# Patient Record
Sex: MALE | Race: WHITE | NOT HISPANIC OR LATINO | Employment: OTHER | URBAN - METROPOLITAN AREA
[De-identification: names, ages, dates, MRNs, and addresses within clinical notes are randomized per-mention and may not be internally consistent; named-entity substitution may affect disease eponyms.]

---

## 2017-03-14 ENCOUNTER — OFFICE VISIT (OUTPATIENT)
Dept: PODIATRY | Facility: CLINIC | Age: 82
End: 2017-03-14
Payer: MEDICARE

## 2017-03-14 VITALS
HEART RATE: 81 BPM | HEIGHT: 70 IN | WEIGHT: 155 LBS | DIASTOLIC BLOOD PRESSURE: 69 MMHG | BODY MASS INDEX: 22.19 KG/M2 | SYSTOLIC BLOOD PRESSURE: 147 MMHG

## 2017-03-14 DIAGNOSIS — E11.51 TYPE 2 DIABETES MELLITUS WITH PERIPHERAL CIRCULATORY DISORDER: Primary | ICD-10-CM

## 2017-03-14 DIAGNOSIS — I87.2 VENOUS INSUFFICIENCY OF BOTH LOWER EXTREMITIES: ICD-10-CM

## 2017-03-14 DIAGNOSIS — B35.1 ONYCHOMYCOSIS: ICD-10-CM

## 2017-03-14 DIAGNOSIS — E11.42 TYPE 2 DIABETES MELLITUS WITH PERIPHERAL NEUROPATHY: ICD-10-CM

## 2017-03-14 PROCEDURE — 11721 DEBRIDE NAIL 6 OR MORE: CPT | Mod: Q9,S$GLB,, | Performed by: PODIATRIST

## 2017-03-14 PROCEDURE — 1160F RVW MEDS BY RX/DR IN RCRD: CPT | Mod: S$GLB,,, | Performed by: PODIATRIST

## 2017-03-14 PROCEDURE — 1157F ADVNC CARE PLAN IN RCRD: CPT | Mod: S$GLB,,, | Performed by: PODIATRIST

## 2017-03-14 PROCEDURE — 99999 PR PBB SHADOW E&M-EST. PATIENT-LVL III: CPT | Mod: PBBFAC,,, | Performed by: PODIATRIST

## 2017-03-14 PROCEDURE — 1126F AMNT PAIN NOTED NONE PRSNT: CPT | Mod: S$GLB,,, | Performed by: PODIATRIST

## 2017-03-14 PROCEDURE — 1159F MED LIST DOCD IN RCRD: CPT | Mod: S$GLB,,, | Performed by: PODIATRIST

## 2017-03-14 PROCEDURE — 99212 OFFICE O/P EST SF 10 MIN: CPT | Mod: 25,S$GLB,, | Performed by: PODIATRIST

## 2017-03-14 RX ORDER — FUROSEMIDE 20 MG/1
TABLET ORAL
COMMUNITY
Start: 2017-02-23

## 2017-03-14 NOTE — MR AVS SNAPSHOT
[facility]   [place]  [place] [address]  Phone: [phone]                  Kulwant Christopher   3/14/2017 10:30 AM   Office Visit    Description:  Male : 1928   Provider:  Wilian Jade DPM   Department:  [facility]           Reason for Visit     Diabetic Foot Exam     Diabetes Mellitus           Diagnoses this Visit        Comments    Type 2 diabetes mellitus with peripheral circulatory disorder    -  Primary     Type 2 diabetes mellitus with peripheral neuropathy         Onychomycosis         Venous insufficiency of both lower extremities                To Do List           Future Appointments        Provider Department Dept Phone    6/15/2017 10:30 AM Wilian Jade DPM [facility] [phone]      Goals (5 Years of Data)     None      Follow-Up and Disposition     Return in about 3 months (around 2017).      [facility]     [facility] On Call Nurse Care Line -  Assistance  Registered nurses in the [facility] On Call Center provide clinical advisement, health education, appointment booking, and other advisory services.  Call for this free service at [phone].             Medications                Verify that the below list of medications is an accurate representation of the medications you are currently taking.  If none reported, the list may be blank. If incorrect, please contact your healthcare provider. Carry this list with you in case of emergency.           Current Medications     clotrimazole-betamethasone 1-0.05% (LOTRISONE) cream Apply topically 2 (two) times daily. Apply to legs and feet twice a day.    COUMADIN 1 mg tablet     COUMADIN 4 mg tablet Take 4 mg by mouth Daily.     digoxin (LANOXIN) 250 mcg tablet Take 0.125 mg by mouth once daily. Patient to take half of tablet.    enalapril (VASOTEC) 20 MG tablet Take 20 mg by mouth once daily.     furosemide (LASIX) 20 MG tablet     KLOR-CON SPRINKLE 10 mEq CpSR     lovastatin  "(MEVACOR) 40 MG tablet Take 40 mg by mouth nightly.     mupirocin (BACTROBAN) 2 % ointment     oxybutynin (DITROPAN-XL) 10 MG 24 hr tablet     psyllium 0.52 gram capsule Take 0.52 g by mouth once daily.    triamcinolone acetonide 0.1% (KENALOG) 0.1 % cream     urea (CARMOL) 40 % Crea Apply topically 2 (two) times daily. Apply to feet twice daily.    verapamil (CALAN-SR) 240 MG CR tablet Take 240 mg by mouth once daily.            Clinical Reference Information           Your Vitals Were     BP Pulse Height Weight BMI    147/69 81 5' 10" (1.778 m) 70.3 kg (155 lb) 22.24 kg/m2      Blood Pressure          Most Recent Value    BP  (!)  147/69      Allergies as of 3/14/2017     No Known Allergies      Immunizations Administered on Date of Encounter - 3/14/2017     None      Language Assistance Services     ATTENTION: Language assistance services are available, free of charge. Please call [phone].      ATENCIÓN: Si habla chrisañol, tiene a bui disposición servicios gratuitos de asistencia lingüística. Llame al [phone].     SIVA Ý: N?u b?n nói Ti?ng Vi?t, có các d?ch v? h? tr? ngôn ng? mi?n phí dành cho b?n. G?i s? [phone].         [facility] complies with applicable Federal civil rights laws and does not discriminate on the basis of race, color, national origin, age, disability, or sex.        "

## 2017-03-14 NOTE — PROGRESS NOTES
"Subjective:      Patient ID: Kulwant Christopher is a 88 y.o. male.    Chief Complaint: Diabetic Foot Exam and Diabetes Mellitus    Kulwant is a 88 y.o. male who presents to the clinic for evaluation and treatment of high risk feet. Kulwant has a past medical history of Diabetes mellitus type II; Hyperlipidemia; and Hypertension. The patient's chief complaint is long, thick toenails. This patient has documented high risk feet requiring routine maintenance secondary to peripheral neuropathy. Patient often gets confused when questioned about his history. Accompanied by his daughter who acts as historian. No new complaints.    PCP: Norberto Collier IV, MD    Date Last Seen by PCP: 1/31/17    Current shoe gear:  Diabetic shoes    No results found for: HGBA1C  Vitals:    03/14/17 1035   BP: (!) 147/69   Pulse: 81   Weight: 70.3 kg (155 lb)   Height: 5' 10" (1.778 m)   PainSc: 0-No pain      Past Medical History:   Diagnosis Date    Diabetes mellitus type II     Hyperlipidemia     Hypertension        Past Surgical History:   Procedure Laterality Date    APPENDECTOMY      CAROTID STENT      CIRCUMCISION, PRIMARY      EYE SURGERY         No family history on file.    Social History     Social History    Marital status:      Spouse name: N/A    Number of children: N/A    Years of education: N/A     Social History Main Topics    Smoking status: Never Smoker    Smokeless tobacco: Never Used    Alcohol use No    Drug use: No    Sexual activity: Not Asked     Other Topics Concern    None     Social History Narrative       Current Outpatient Prescriptions   Medication Sig Dispense Refill    clotrimazole-betamethasone 1-0.05% (LOTRISONE) cream Apply topically 2 (two) times daily. Apply to legs and feet twice a day. 45 g 3    COUMADIN 1 mg tablet       COUMADIN 4 mg tablet Take 4 mg by mouth Daily.       digoxin (LANOXIN) 250 mcg tablet Take 0.125 mg by mouth once daily. Patient to take half of tablet.      " enalapril (VASOTEC) 20 MG tablet Take 20 mg by mouth once daily.       furosemide (LASIX) 20 MG tablet       KLOR-CON SPRINKLE 10 mEq CpSR       lovastatin (MEVACOR) 40 MG tablet Take 40 mg by mouth nightly.       mupirocin (BACTROBAN) 2 % ointment       oxybutynin (DITROPAN-XL) 10 MG 24 hr tablet       psyllium 0.52 gram capsule Take 0.52 g by mouth once daily.      triamcinolone acetonide 0.1% (KENALOG) 0.1 % cream       urea (CARMOL) 40 % Crea Apply topically 2 (two) times daily. Apply to feet twice daily. 85 g 2    verapamil (CALAN-SR) 240 MG CR tablet Take 240 mg by mouth once daily.        No current facility-administered medications for this visit.        Review of patient's allergies indicates:  No Known Allergies    Review of Systems   Constitution: Negative for chills, fever, weakness and malaise/fatigue.   Cardiovascular: Positive for leg swelling. Negative for chest pain and claudication.   Respiratory: Negative for cough and shortness of breath.    Skin: Positive for color change and nail changes. Negative for itching and rash.   Musculoskeletal: Positive for arthritis, joint pain and stiffness. Negative for back pain, muscle cramps and muscle weakness.   Gastrointestinal: Negative for nausea and vomiting.   Neurological: Negative for numbness and paresthesias.   Psychiatric/Behavioral: Negative for altered mental status.           Objective:      Physical Exam   Constitutional: No distress.   Cardiovascular: Intact distal pulses.    Pulses:       Dorsalis pedis pulses are 0 on the right side, and 1+ on the left side.        Posterior tibial pulses are 0 on the right side, and 0 on the left side.   CFT< 3 secs all toes bilateral foot, skin temp warm bilateral foot, no digital hair growth bilateral foot, mild to moderate non-pitting lower extremity edema telangiectasias bilateral.         Musculoskeletal:        Right foot: There is decreased range of motion and deformity.        Left foot:  There is decreased range of motion and deformity.   Semi-reducible digital contractures toes 2-5 bilateral foot. + gastrocnemius equinus bilateral. High arched supinated foot type bilateral foot. No pain with ROM or MMT bilateral foot.   Feet:   Right Foot:   Protective Sensation: 10 sites tested. 4 sites sensed.   Skin Integrity: Positive for dry skin. Negative for ulcer, blister, skin breakdown, erythema, warmth or callus.   Left Foot:   Protective Sensation: 10 sites tested. 9 sites sensed.   Skin Integrity: Positive for dry skin. Negative for ulcer, blister, skin breakdown, erythema, warmth or callus.   Neurological: He is alert. He has normal strength. A sensory deficit is present.   Vibratory sensation decreased left foot and absent right foot.   Skin: Skin is warm, dry and intact. No ecchymosis, no lesion, no petechiae and no rash noted. He is not diaphoretic. No cyanosis or erythema. No pallor. Nails show no clubbing.   Nails 1-5 bilateral are thickened, brittle, elongated, yellow and dystrophic with debris.     No open lesions or macerations bilateral lower extremity.     Skin is dry and flaky bilateral lower extremity.               Assessment:       Encounter Diagnoses   Name Primary?    Type 2 diabetes mellitus with peripheral circulatory disorder Yes    Type 2 diabetes mellitus with peripheral neuropathy     Onychomycosis     Venous insufficiency of both lower extremities          Plan:       Kulwant was seen today for diabetic foot exam and diabetes mellitus.    Diagnoses and all orders for this visit:    Type 2 diabetes mellitus with peripheral circulatory disorder    Type 2 diabetes mellitus with peripheral neuropathy    Onychomycosis    Venous insufficiency of both lower extremities      I counseled the patient on his conditions, their implications and medical management.    Shoe inspection. Diabetic Foot Education. Patient reminded of the importance of good nutrition and blood sugar control to  help prevent podiatric complications of diabetes. Patient instructed on proper foot hygeine. We discussed wearing proper shoe gear, daily foot inspections, never walking without protective shoe gear, never putting sharp instruments to feet, routine podiatric nail visits every 2-3 months.      With patient's permission, nails were aggressively reduced and debrided x 10 to their soft tissue attachment mechanically and with electric , removing all offending nail and debris. Patient relates relief following the procedure. He will continue to monitor the areas daily, inspect his feet, wear protective shoe gear when ambulatory, moisturizer to maintain skin integrity and follow in this office in approximately 2-3 months, sooner p.r.n.    Instructed to elevate feet above heart level.    RTC 3 months or prn.

## 2017-06-15 ENCOUNTER — OFFICE VISIT (OUTPATIENT)
Dept: PODIATRY | Facility: CLINIC | Age: 82
End: 2017-06-15
Payer: MEDICARE

## 2017-06-15 VITALS
WEIGHT: 155 LBS | SYSTOLIC BLOOD PRESSURE: 128 MMHG | HEIGHT: 70 IN | DIASTOLIC BLOOD PRESSURE: 61 MMHG | HEART RATE: 65 BPM | BODY MASS INDEX: 22.19 KG/M2

## 2017-06-15 DIAGNOSIS — E11.42 TYPE 2 DIABETES MELLITUS WITH PERIPHERAL NEUROPATHY: ICD-10-CM

## 2017-06-15 DIAGNOSIS — M20.10 HALLUX VALGUS (ACQUIRED), UNSPECIFIED FOOT: ICD-10-CM

## 2017-06-15 DIAGNOSIS — B35.1 ONYCHOMYCOSIS: ICD-10-CM

## 2017-06-15 DIAGNOSIS — E11.51 TYPE 2 DIABETES MELLITUS WITH PERIPHERAL CIRCULATORY DISORDER: Primary | ICD-10-CM

## 2017-06-15 PROCEDURE — 99999 PR PBB SHADOW E&M-EST. PATIENT-LVL IV: CPT | Mod: PBBFAC,,, | Performed by: PODIATRIST

## 2017-06-15 PROCEDURE — 11721 DEBRIDE NAIL 6 OR MORE: CPT | Mod: Q9,S$GLB,, | Performed by: PODIATRIST

## 2017-06-15 PROCEDURE — 99499 UNLISTED E&M SERVICE: CPT | Mod: S$GLB,,, | Performed by: PODIATRIST

## 2017-06-17 NOTE — PROGRESS NOTES
"Subjective:      Patient ID: Kulwant Christopher is a 89 y.o. male.    Chief Complaint: Diabetes Mellitus and Nail Care    Kulwant is a 89 y.o. male who presents to the clinic for evaluation and treatment of high risk feet. Kulwant has a past medical history of Diabetes mellitus type II; Hyperlipidemia; and Hypertension. The patient's chief complaint is long, thick toenails. This patient has documented high risk feet requiring routine maintenance secondary to peripheral neuropathy. Patient often gets confused when questioned about his history. Accompanied by his daughter who acts as historian. No new complaints.    PCP: Norberto Collier IV, MD    Date Last Seen by PCP: 1/31/17    Current shoe gear:  Diabetic shoes    No results found for: HGBA1C  Vitals:    06/15/17 1049   BP: 128/61   Pulse: 65   Weight: 70.3 kg (155 lb)   Height: 5' 10" (1.778 m)   PainSc: 0-No pain      Past Medical History:   Diagnosis Date    Diabetes mellitus type II     Hyperlipidemia     Hypertension        Past Surgical History:   Procedure Laterality Date    APPENDECTOMY      CAROTID STENT      CIRCUMCISION, PRIMARY      EYE SURGERY         No family history on file.    Social History     Social History    Marital status:      Spouse name: N/A    Number of children: N/A    Years of education: N/A     Social History Main Topics    Smoking status: Never Smoker    Smokeless tobacco: Never Used    Alcohol use No    Drug use: No    Sexual activity: Not Asked     Other Topics Concern    None     Social History Narrative    None       Current Outpatient Prescriptions   Medication Sig Dispense Refill    clotrimazole-betamethasone 1-0.05% (LOTRISONE) cream Apply topically 2 (two) times daily. Apply to legs and feet twice a day. 45 g 3    COUMADIN 1 mg tablet       COUMADIN 4 mg tablet Take 4 mg by mouth Daily.       digoxin (LANOXIN) 250 mcg tablet Take 0.125 mg by mouth once daily. Patient to take half of tablet.      " enalapril (VASOTEC) 20 MG tablet Take 20 mg by mouth once daily.       furosemide (LASIX) 20 MG tablet       KLOR-CON SPRINKLE 10 mEq CpSR       lovastatin (MEVACOR) 40 MG tablet Take 40 mg by mouth nightly.       mupirocin (BACTROBAN) 2 % ointment       oxybutynin (DITROPAN-XL) 10 MG 24 hr tablet       psyllium 0.52 gram capsule Take 0.52 g by mouth once daily.      triamcinolone acetonide 0.1% (KENALOG) 0.1 % cream       urea (CARMOL) 40 % Crea Apply topically 2 (two) times daily. Apply to feet twice daily. 85 g 2    verapamil (CALAN-SR) 240 MG CR tablet Take 240 mg by mouth once daily.        No current facility-administered medications for this visit.        Review of patient's allergies indicates:  No Known Allergies    Review of Systems   Constitution: Negative for chills, fever, weakness and malaise/fatigue.   Cardiovascular: Positive for leg swelling. Negative for chest pain and claudication.   Respiratory: Negative for cough and shortness of breath.    Skin: Positive for color change and nail changes. Negative for itching and rash.   Musculoskeletal: Positive for arthritis, joint pain and stiffness. Negative for back pain, muscle cramps and muscle weakness.   Gastrointestinal: Negative for nausea and vomiting.   Neurological: Negative for numbness and paresthesias.   Psychiatric/Behavioral: Negative for altered mental status.           Objective:      Physical Exam   Constitutional: No distress.   Cardiovascular: Intact distal pulses.    Pulses:       Dorsalis pedis pulses are 0 on the right side, and 1+ on the left side.        Posterior tibial pulses are 0 on the right side, and 0 on the left side.   CFT< 3 secs all toes bilateral foot, skin temp warm bilateral foot, no digital hair growth bilateral foot, mild to moderate non-pitting lower extremity edema telangiectasias bilateral.         Musculoskeletal:        Right foot: There is decreased range of motion and deformity.        Left foot:  There is decreased range of motion and deformity.   Semi-reducible digital contractures toes 2-5 bilateral foot. Track bound hallux abducto valgus bilateral foot R>L.     + gastrocnemius equinus bilateral.     High arched supinated foot type bilateral foot.     No pain with ROM or MMT bilateral foot.   Feet:   Right Foot:   Protective Sensation: 10 sites tested. 4 sites sensed.   Skin Integrity: Positive for dry skin. Negative for ulcer, blister, skin breakdown, erythema, warmth or callus.   Left Foot:   Protective Sensation: 10 sites tested. 9 sites sensed.   Skin Integrity: Positive for dry skin. Negative for ulcer, blister, skin breakdown, erythema, warmth or callus.   Neurological: He is alert. He has normal strength. A sensory deficit is present.   Vibratory sensation decreased left foot and absent right foot.   Skin: Skin is warm, dry and intact. Capillary refill takes 2 to 3 seconds. No ecchymosis, no lesion, no petechiae and no rash noted. He is not diaphoretic. No cyanosis or erythema. No pallor. Nails show no clubbing.   Nails 1-5 bilateral are thickened, brittle, elongated, yellow and dystrophic with debris.     No open lesions or macerations bilateral lower extremity.     Skin is dry and flaky bilateral lower extremity.               Assessment:       Encounter Diagnoses   Name Primary?    Type 2 diabetes mellitus with peripheral circulatory disorder Yes    Type 2 diabetes mellitus with peripheral neuropathy     Hallux valgus (acquired), unspecified foot     Onychomycosis          Plan:       Kulwant was seen today for diabetes mellitus and nail care.    Diagnoses and all orders for this visit:    Type 2 diabetes mellitus with peripheral circulatory disorder  -     DIABETIC SHOES FOR HOME USE    Type 2 diabetes mellitus with peripheral neuropathy  -     DIABETIC SHOES FOR HOME USE    Hallux valgus (acquired), unspecified foot  -     DIABETIC SHOES FOR HOME USE    Onychomycosis  -     DIABETIC SHOES  FOR HOME USE      I counseled the patient on his conditions, their implications and medical management.    Shoe inspection. Diabetic Foot Education. Patient reminded of the importance of good nutrition and blood sugar control to help prevent podiatric complications of diabetes. Patient instructed on proper foot hygeine. We discussed wearing proper shoe gear, daily foot inspections, never walking without protective shoe gear, never putting sharp instruments to feet, routine podiatric nail visits every 2-3 months.      With patient's permission, nails were aggressively reduced and debrided x 10 to their soft tissue attachment mechanically and with electric , removing all offending nail and debris. Patient relates relief following the procedure. He will continue to monitor the areas daily, inspect his feet, wear protective shoe gear when ambulatory, moisturizer to maintain skin integrity and follow in this office in approximately 2-3 months, sooner p.r.n.    RTC 3 months or prn.

## 2017-08-23 ENCOUNTER — TELEPHONE (OUTPATIENT)
Dept: PODIATRY | Facility: CLINIC | Age: 82
End: 2017-08-23

## 2017-08-23 DIAGNOSIS — E11.51 PERIPHERAL CIRCULATORY DISORDER ASSOCIATED WITH TYPE 2 DIABETES MELLITUS: Primary | ICD-10-CM

## 2017-08-23 DIAGNOSIS — I87.2 VENOUS INSUFFICIENCY OF BOTH LOWER EXTREMITIES: ICD-10-CM

## 2017-08-23 NOTE — TELEPHONE ENCOUNTER
----- Message from Geovanna Rico sent at 8/23/2017  3:18 PM CDT -----  Contact: Self [phone]  Calling to see what is the size of her dad's compression socks. Please advice

## 2017-09-19 ENCOUNTER — OFFICE VISIT (OUTPATIENT)
Dept: PODIATRY | Facility: CLINIC | Age: 82
End: 2017-09-19
Payer: MEDICARE

## 2017-09-19 VITALS
SYSTOLIC BLOOD PRESSURE: 131 MMHG | HEIGHT: 70 IN | BODY MASS INDEX: 22.19 KG/M2 | HEART RATE: 81 BPM | WEIGHT: 155 LBS | DIASTOLIC BLOOD PRESSURE: 44 MMHG

## 2017-09-19 DIAGNOSIS — E11.42 TYPE 2 DIABETES MELLITUS WITH PERIPHERAL NEUROPATHY: Primary | ICD-10-CM

## 2017-09-19 DIAGNOSIS — E11.51 PERIPHERAL CIRCULATORY DISORDER ASSOCIATED WITH TYPE 2 DIABETES MELLITUS: ICD-10-CM

## 2017-09-19 DIAGNOSIS — L30.9 DERMATITIS: ICD-10-CM

## 2017-09-19 DIAGNOSIS — B35.1 ONYCHOMYCOSIS: ICD-10-CM

## 2017-09-19 PROCEDURE — 3008F BODY MASS INDEX DOCD: CPT | Mod: S$GLB,,, | Performed by: PODIATRIST

## 2017-09-19 PROCEDURE — 1126F AMNT PAIN NOTED NONE PRSNT: CPT | Mod: S$GLB,,, | Performed by: PODIATRIST

## 2017-09-19 PROCEDURE — 11720 DEBRIDE NAIL 1-5: CPT | Mod: Q9,S$GLB,, | Performed by: PODIATRIST

## 2017-09-19 PROCEDURE — 1159F MED LIST DOCD IN RCRD: CPT | Mod: S$GLB,,, | Performed by: PODIATRIST

## 2017-09-19 PROCEDURE — 99999 PR PBB SHADOW E&M-EST. PATIENT-LVL III: CPT | Mod: PBBFAC,,, | Performed by: PODIATRIST

## 2017-09-19 PROCEDURE — 99213 OFFICE O/P EST LOW 20 MIN: CPT | Mod: 25,S$GLB,, | Performed by: PODIATRIST

## 2017-09-19 RX ORDER — CLOTRIMAZOLE AND BETAMETHASONE DIPROPIONATE 10; .64 MG/G; MG/G
CREAM TOPICAL 2 TIMES DAILY
Qty: 45 G | Refills: 3 | Status: SHIPPED | OUTPATIENT
Start: 2017-09-19

## 2017-09-20 NOTE — PROGRESS NOTES
"Subjective:      Patient ID: Kulwant Christopher is a 89 y.o. male.    Chief Complaint: Follow-up and Diabetic Foot Exam    Kulwant is a 89 y.o. male who presents to the clinic for evaluation and treatment of high risk feet. Kulwant has a past medical history of Diabetes mellitus type II; Hyperlipidemia; and Hypertension. The patient's chief complaint is long, thick toenails. This patient has documented high risk feet requiring routine maintenance secondary to peripheral neuropathy. Patient often gets confused when questioned about his history. Accompanied by his daughter who acts as historian. No new complaints. Reports trimming his nails last night.     PCP: Norberto Collier IV, MD    Date Last Seen by PCP: 1/31/17    Current shoe gear:  Diabetic shoes    No results found for: HGBA1C  Vitals:    09/19/17 0950   BP: (!) 131/44   Pulse: 81   Weight: 70.3 kg (155 lb)   Height: 5' 10" (1.778 m)   PainSc: 0-No pain      Past Medical History:   Diagnosis Date    Diabetes mellitus type II     Hyperlipidemia     Hypertension        Past Surgical History:   Procedure Laterality Date    APPENDECTOMY      CAROTID STENT      CIRCUMCISION, PRIMARY      EYE SURGERY         No family history on file.    Social History     Social History    Marital status:      Spouse name: N/A    Number of children: N/A    Years of education: N/A     Social History Main Topics    Smoking status: Never Smoker    Smokeless tobacco: Never Used    Alcohol use No    Drug use: No    Sexual activity: Not Asked     Other Topics Concern    None     Social History Narrative    None       Current Outpatient Prescriptions   Medication Sig Dispense Refill    clotrimazole-betamethasone 1-0.05% (LOTRISONE) cream Apply topically 2 (two) times daily. Apply to legs and feet twice a day. 45 g 3    COUMADIN 1 mg tablet       COUMADIN 4 mg tablet Take 4 mg by mouth Daily.       digoxin (LANOXIN) 250 mcg tablet Take 0.125 mg by mouth once " daily. Patient to take half of tablet.      enalapril (VASOTEC) 20 MG tablet Take 20 mg by mouth once daily.       furosemide (LASIX) 20 MG tablet       KLOR-CON SPRINKLE 10 mEq CpSR       lovastatin (MEVACOR) 40 MG tablet Take 40 mg by mouth nightly.       mupirocin (BACTROBAN) 2 % ointment       oxybutynin (DITROPAN-XL) 10 MG 24 hr tablet       psyllium 0.52 gram capsule Take 0.52 g by mouth once daily.      triamcinolone acetonide 0.1% (KENALOG) 0.1 % cream       urea (CARMOL) 40 % Crea Apply topically 2 (two) times daily. Apply to feet twice daily. 85 g 2    verapamil (CALAN-SR) 240 MG CR tablet Take 240 mg by mouth once daily.        No current facility-administered medications for this visit.        Review of patient's allergies indicates:  No Known Allergies    Review of Systems   Constitution: Negative for chills, fever, weakness and malaise/fatigue.   Cardiovascular: Positive for leg swelling. Negative for chest pain and claudication.   Respiratory: Negative for cough and shortness of breath.    Skin: Positive for color change and nail changes. Negative for itching and rash.   Musculoskeletal: Positive for arthritis, joint pain and stiffness. Negative for back pain, muscle cramps and muscle weakness.   Gastrointestinal: Negative for nausea and vomiting.   Neurological: Negative for numbness and paresthesias.   Psychiatric/Behavioral: Negative for altered mental status.           Objective:      Physical Exam   Constitutional: No distress.   Cardiovascular: Intact distal pulses.    Pulses:       Dorsalis pedis pulses are 0 on the right side, and 1+ on the left side.        Posterior tibial pulses are 0 on the right side, and 0 on the left side.   CFT< 3 secs all toes bilateral foot, skin temp warm bilateral foot, no digital hair growth bilateral foot, mild to moderate non-pitting lower extremity edema telangiectasias bilateral.         Musculoskeletal:        Right foot: There is decreased range of  motion and deformity.        Left foot: There is decreased range of motion and deformity.   Semi-reducible digital contractures toes 2-5 bilateral foot. Track bound hallux abducto valgus bilateral foot R>L.     + gastrocnemius equinus bilateral.     High arched supinated foot type bilateral foot.     No pain with ROM or MMT bilateral foot.   Feet:   Right Foot:   Protective Sensation: 10 sites tested. 4 sites sensed.   Skin Integrity: Positive for dry skin. Negative for ulcer, blister, skin breakdown, erythema, warmth or callus.   Left Foot:   Protective Sensation: 10 sites tested. 9 sites sensed.   Skin Integrity: Positive for dry skin. Negative for ulcer, blister, skin breakdown, erythema, warmth or callus.   Neurological: He is alert. He has normal strength. A sensory deficit is present.   Vibratory sensation decreased left foot and absent right foot.   Skin: Skin is warm, dry and intact. Capillary refill takes 2 to 3 seconds. No ecchymosis, no lesion, no petechiae and no rash noted. He is not diaphoretic. No cyanosis or erythema. No pallor. Nails show no clubbing.   Nails 1-5 right are thickened, brittle, elongated, yellow and dystrophic with debris.     No open lesions or macerations bilateral lower extremity.     Skin is dry and flaky bilateral lower extremity. Rash noted to dorsal left foot.                Assessment:       Encounter Diagnoses   Name Primary?    Type 2 diabetes mellitus with peripheral neuropathy Yes    Peripheral circulatory disorder associated with type 2 diabetes mellitus     Onychomycosis     Dermatitis          Plan:       Kulwant was seen today for follow-up and diabetic foot exam.    Diagnoses and all orders for this visit:    Type 2 diabetes mellitus with peripheral neuropathy    Peripheral circulatory disorder associated with type 2 diabetes mellitus    Onychomycosis    Dermatitis  -     clotrimazole-betamethasone 1-0.05% (LOTRISONE) cream; Apply topically 2 (two) times daily.  Apply to legs and feet twice a day.      I counseled the patient on his conditions, their implications and medical management.    Shoe inspection. Diabetic Foot Education. Patient reminded of the importance of good nutrition and blood sugar control to help prevent podiatric complications of diabetes. Patient instructed on proper foot hygeine. We discussed wearing proper shoe gear, daily foot inspections, never walking without protective shoe gear, never putting sharp instruments to feet, routine podiatric nail visits every 2-3 months.      With patient's permission, nails were aggressively reduced and debrided x 5 to their soft tissue attachment mechanically and with electric , removing all offending nail and debris. Patient relates relief following the procedure. He will continue to monitor the areas daily, inspect his feet, wear protective shoe gear when ambulatory, moisturizer to maintain skin integrity and follow in this office in approximately 2-3 months, sooner p.r.n.    Instructed to apply lotrisone to B/L feet.     RTC 3 months or prn.      Lorenza Cline DPM PGY-3    I have personally taken the history and examined this patient and agree with the resident's note as stated as above.   Wilian Jade DPM, FACFAS

## 2018-01-03 ENCOUNTER — TELEPHONE (OUTPATIENT)
Dept: PODIATRY | Facility: CLINIC | Age: 83
End: 2018-01-03

## 2018-01-03 NOTE — TELEPHONE ENCOUNTER
----- Message from Nava Massey sent at 1/3/2018  1:50 PM CST -----  Contact: Delia, daughter, [phone]  Requests to speak with you regarding 1/22 appt.  Please advise.

## 2018-01-22 ENCOUNTER — OFFICE VISIT (OUTPATIENT)
Dept: PODIATRY | Facility: CLINIC | Age: 83
End: 2018-01-22
Payer: MEDICARE

## 2018-01-22 VITALS
BODY MASS INDEX: 22.19 KG/M2 | SYSTOLIC BLOOD PRESSURE: 123 MMHG | DIASTOLIC BLOOD PRESSURE: 58 MMHG | HEART RATE: 77 BPM | HEIGHT: 70 IN | WEIGHT: 155 LBS

## 2018-01-22 DIAGNOSIS — E11.51 PERIPHERAL CIRCULATORY DISORDER ASSOCIATED WITH TYPE 2 DIABETES MELLITUS: ICD-10-CM

## 2018-01-22 DIAGNOSIS — I87.2 VENOUS INSUFFICIENCY OF BOTH LOWER EXTREMITIES: ICD-10-CM

## 2018-01-22 DIAGNOSIS — E11.42 TYPE 2 DIABETES MELLITUS WITH PERIPHERAL NEUROPATHY: Primary | ICD-10-CM

## 2018-01-22 DIAGNOSIS — B35.1 ONYCHOMYCOSIS: ICD-10-CM

## 2018-01-22 DIAGNOSIS — L30.9 DERMATITIS: ICD-10-CM

## 2018-01-22 PROCEDURE — 99212 OFFICE O/P EST SF 10 MIN: CPT | Mod: 25,S$GLB,, | Performed by: PODIATRIST

## 2018-01-22 PROCEDURE — 99999 PR PBB SHADOW E&M-EST. PATIENT-LVL III: CPT | Mod: PBBFAC,,, | Performed by: PODIATRIST

## 2018-01-22 PROCEDURE — 11721 DEBRIDE NAIL 6 OR MORE: CPT | Mod: Q9,S$GLB,, | Performed by: PODIATRIST

## 2018-01-22 NOTE — PROCEDURES
"Routine Foot Care  Date/Time: 1/22/2018 3:27 PM  Performed by: NICANOR KIDD  Authorized by: NICANOR KIDD     Time out: Immediately prior to procedure a "time out" was called to verify the correct patient, procedure, equipment, support staff and site/side marked as required.    Consent Done?:  Yes (Verbal)  Hyperkeratotic Skin Lesions?: No      Nail Care Type:  Debride  Location(s): All  (Left 1st Toe, Left 3rd Toe, Left 2nd Toe, Left 4th Toe, Left 5th Toe, Right 1st Toe, Right 2nd Toe, Right 3rd Toe, Right 4th Toe and Right 5th Toe)  Patient tolerance:  Patient tolerated the procedure well with no immediate complications      "

## 2018-01-23 ENCOUNTER — TELEPHONE (OUTPATIENT)
Dept: PODIATRY | Facility: CLINIC | Age: 83
End: 2018-01-23

## 2018-01-23 NOTE — PROGRESS NOTES
"Subjective:      Patient ID: Kulwant Christopher is a 89 y.o. male.    Chief Complaint: Nail Care (Pt doesn't have a PCP at the moment, no upcoming appt) and Foot Swelling (Right ankle)    Kulwant is a 89 y.o. male who presents to the clinic for evaluation and treatment of high risk feet. Kulwant has a past medical history of Diabetes mellitus type II; Hyperlipidemia; and Hypertension. The patient's chief complaint is long, thick toenails. This patient has documented high risk feet requiring routine maintenance secondary to peripheral neuropathy. Patient often gets confused when questioned about his history. Accompanied by his daughter who acts as historian. No new complaints. Reports trimming his nails last night.     PCP: Norberto Collier IV, MD    Date Last Seen by PCP: 1/31/17    Current shoe gear:  Diabetic shoes    No results found for: HGBA1C  Vitals:    01/22/18 1509   BP: (!) 123/58   Pulse: 77   Weight: 70.3 kg (155 lb)   Height: 5' 10" (1.778 m)   PainSc: 0-No pain      Past Medical History:   Diagnosis Date    Diabetes mellitus type II     Hyperlipidemia     Hypertension        Past Surgical History:   Procedure Laterality Date    APPENDECTOMY      CAROTID STENT      CIRCUMCISION, PRIMARY      EYE SURGERY         No family history on file.    Social History     Social History    Marital status:      Spouse name: N/A    Number of children: N/A    Years of education: N/A     Social History Main Topics    Smoking status: Never Smoker    Smokeless tobacco: Never Used    Alcohol use No    Drug use: No    Sexual activity: Not Asked     Other Topics Concern    None     Social History Narrative    None       Current Outpatient Prescriptions   Medication Sig Dispense Refill    clotrimazole-betamethasone 1-0.05% (LOTRISONE) cream Apply topically 2 (two) times daily. Apply to legs and feet twice a day. 45 g 3    COUMADIN 1 mg tablet       COUMADIN 4 mg tablet Take 4 mg by mouth Daily.       " digoxin (LANOXIN) 250 mcg tablet Take 0.125 mg by mouth once daily. Patient to take half of tablet.      enalapril (VASOTEC) 20 MG tablet Take 20 mg by mouth once daily.       furosemide (LASIX) 20 MG tablet       KLOR-CON SPRINKLE 10 mEq CpSR       lovastatin (MEVACOR) 40 MG tablet Take 40 mg by mouth nightly.       mupirocin (BACTROBAN) 2 % ointment       oxybutynin (DITROPAN-XL) 10 MG 24 hr tablet       psyllium 0.52 gram capsule Take 0.52 g by mouth once daily.      triamcinolone acetonide 0.1% (KENALOG) 0.1 % cream       urea (CARMOL) 40 % Crea Apply topically 2 (two) times daily. Apply to feet twice daily. 85 g 2    verapamil (CALAN-SR) 240 MG CR tablet Take 240 mg by mouth once daily.        No current facility-administered medications for this visit.        Review of patient's allergies indicates:  No Known Allergies    Review of Systems   Constitution: Negative for chills, fever, weakness and malaise/fatigue.   Cardiovascular: Positive for leg swelling. Negative for chest pain and claudication.   Respiratory: Negative for cough and shortness of breath.    Skin: Positive for color change and nail changes. Negative for itching and rash.   Musculoskeletal: Positive for arthritis, joint pain and stiffness. Negative for back pain, muscle cramps and muscle weakness.   Gastrointestinal: Negative for nausea and vomiting.   Neurological: Negative for numbness and paresthesias.   Psychiatric/Behavioral: Negative for altered mental status.           Objective:      Physical Exam   Constitutional: No distress.   Cardiovascular: Intact distal pulses.    Pulses:       Dorsalis pedis pulses are 0 on the right side, and 1+ on the left side.        Posterior tibial pulses are 0 on the right side, and 0 on the left side.   CFT< 3 secs all toes bilateral foot, skin temp warm bilateral foot, no digital hair growth bilateral foot, mild to moderate non-pitting lower extremity edema telangiectasias bilateral.          Musculoskeletal:        Right foot: There is decreased range of motion and deformity.        Left foot: There is decreased range of motion and deformity.   Semi-reducible digital contractures toes 2-5 bilateral foot. Track bound hallux abducto valgus bilateral foot R>L.     + gastrocnemius equinus bilateral.     High arched supinated foot type bilateral foot.     No pain with ROM or MMT bilateral foot.   Feet:   Right Foot:   Protective Sensation: 10 sites tested. 4 sites sensed.   Skin Integrity: Positive for dry skin. Negative for ulcer, blister, skin breakdown, erythema, warmth or callus.   Left Foot:   Protective Sensation: 10 sites tested. 9 sites sensed.   Skin Integrity: Positive for dry skin. Negative for ulcer, blister, skin breakdown, erythema, warmth or callus.   Neurological: He is alert. He has normal strength. A sensory deficit is present.   Vibratory sensation decreased left foot and absent right foot.   Skin: Skin is warm, dry and intact. Capillary refill takes 2 to 3 seconds. Rash noted. No ecchymosis, no lesion and no petechiae noted. Rash is papular. He is not diaphoretic. No cyanosis or erythema. No pallor. Nails show no clubbing.   Nails 1-5 right are thickened 2-3 mm, brittle, elongated 2-3 mm, yellow and dystrophic with debris.     No open lesions or macerations bilateral lower extremity.     Skin is dry and flaky bilateral lower extremity. Rash noted to dorsal left foot.                Assessment:       Encounter Diagnoses   Name Primary?    Type 2 diabetes mellitus with peripheral neuropathy Yes    Peripheral circulatory disorder associated with type 2 diabetes mellitus     Dermatitis     Onychomycosis     Venous insufficiency of both lower extremities          Plan:       Kulwant was seen today for nail care and foot swelling.    Diagnoses and all orders for this visit:    Type 2 diabetes mellitus with peripheral neuropathy  -     Foot Care    Peripheral circulatory disorder  associated with type 2 diabetes mellitus  -     Foot Care    Dermatitis    Onychomycosis  -     Foot Care    Venous insufficiency of both lower extremities      I counseled the patient on his conditions, their implications and medical management.    Shoe inspection. Diabetic Foot Education. Patient reminded of the importance of good nutrition and blood sugar control to help prevent podiatric complications of diabetes. Patient instructed on proper foot hygeine. We discussed wearing proper shoe gear, daily foot inspections, never walking without protective shoe gear, never putting sharp instruments to feet, routine podiatric nail visits every 2-3 months.      Routine foot care per attached note.    Recommend twice daily use of emollient cream as discussed.

## 2018-06-11 ENCOUNTER — TELEPHONE (OUTPATIENT)
Dept: PODIATRY | Facility: CLINIC | Age: 83
End: 2018-06-11

## 2018-06-11 NOTE — TELEPHONE ENCOUNTER
----- Message from Casi Manjarrez sent at 6/11/2018 11:29 AM CDT -----  Contact: Delia daughter - [phone]  Delia daughter - [phone]    Need you to tear up the check she wrote on today,  Because her father did not see the DrCecile Because the DrCecile Was late. Please advise

## 2018-06-27 ENCOUNTER — OFFICE VISIT (OUTPATIENT)
Dept: PODIATRY | Facility: CLINIC | Age: 83
End: 2018-06-27
Payer: MEDICARE

## 2018-06-27 VITALS
HEIGHT: 70 IN | WEIGHT: 155 LBS | BODY MASS INDEX: 22.19 KG/M2 | SYSTOLIC BLOOD PRESSURE: 148 MMHG | HEART RATE: 80 BPM | DIASTOLIC BLOOD PRESSURE: 60 MMHG

## 2018-06-27 DIAGNOSIS — E11.42 DIABETIC POLYNEUROPATHY ASSOCIATED WITH TYPE 2 DIABETES MELLITUS: ICD-10-CM

## 2018-06-27 DIAGNOSIS — R60.9 SWELLING: Primary | ICD-10-CM

## 2018-06-27 DIAGNOSIS — B35.1 ONYCHOMYCOSIS DUE TO DERMATOPHYTE: ICD-10-CM

## 2018-06-27 PROCEDURE — 99999 PR PBB SHADOW E&M-EST. PATIENT-LVL III: CPT | Mod: PBBFAC,,, | Performed by: PODIATRIST

## 2018-06-27 PROCEDURE — 99214 OFFICE O/P EST MOD 30 MIN: CPT | Mod: 25,S$GLB,, | Performed by: PODIATRIST

## 2018-06-27 PROCEDURE — 11721 DEBRIDE NAIL 6 OR MORE: CPT | Mod: Q9,S$GLB,, | Performed by: PODIATRIST

## 2018-06-29 ENCOUNTER — TELEPHONE (OUTPATIENT)
Dept: PODIATRY | Facility: CLINIC | Age: 83
End: 2018-06-29

## 2018-06-29 NOTE — TELEPHONE ENCOUNTER
----- Message from Nkechi Rivera sent at 6/29/2018 10:54 AM CDT -----  Yulia with [facility] [employer] called.  No. [phone]  Option 3    Please fax script for compression stockings and clinical notes.  Fax no. [phone]

## 2018-07-01 NOTE — PROGRESS NOTES
Subjective:      Patient ID: Kulwant Christopher is a 89 y.o. male.    Chief Complaint: Diabetes Mellitus (pt states he does not hava a PCP); Diabetic Foot Exam; and Routine Foot Care    Kulwant is a 89 y.o. male who presents to the clinic for evaluation and treatment of high risk feet. Kulwant has a past medical history of Diabetes mellitus type II; Hyperlipidemia; and Hypertension. The patient's chief complaint is long, thick toenails. This patient has documented high risk feet requiring routine maintenance secondary to diabetes mellitis and those secondary complications of diabetes, as mentioned..    PCP: Norberto Collier IV, MD    Date Last Seen by PCP: Ender Gomes 6/2018    Current shoe gear:  Affected Foot: Tennis shoes     Unaffected Foot: Tennis shoes    No results found for: HGBA1C    Review of Systems   Constitution: Negative for chills, fever and malaise/fatigue.   HENT: Negative for hearing loss.    Cardiovascular: Positive for leg swelling. Negative for claudication.   Respiratory: Negative for shortness of breath.    Skin: Positive for color change and nail changes. Negative for flushing and rash.   Musculoskeletal: Negative for joint pain and myalgias.   Neurological: Negative for loss of balance, numbness, paresthesias and sensory change.   Psychiatric/Behavioral: Negative for altered mental status.           Objective:      Physical Exam   Constitutional: He is oriented to person, place, and time. He appears well-developed and well-nourished.   Cardiovascular:   Pulses:       Dorsalis pedis pulses are 1+ on the right side, and 1+ on the left side.        Posterior tibial pulses are 1+ on the right side, and 1+ on the left side.   Non pitting edema noted to b/L LEs   Musculoskeletal:        Right knee: He exhibits no swelling and no ecchymosis.        Left knee: He exhibits no swelling and no ecchymosis.        Right ankle: He exhibits normal range of motion, no swelling, no ecchymosis and normal pulse.  No lateral malleolus, no medial malleolus and no head of 5th metatarsal tenderness found. Achilles tendon exhibits no pain, no defect and normal Hernández's test results.        Left ankle: He exhibits normal range of motion, no swelling, no ecchymosis and normal pulse. No lateral malleolus, no medial malleolus and no head of 5th metatarsal tenderness found. Achilles tendon exhibits no pain and normal Hernández's test results.        Right lower leg: He exhibits no tenderness, no bony tenderness, no swelling, no edema and no deformity.        Left lower leg: He exhibits no tenderness, no swelling and no edema.        Right foot: There is normal range of motion and no deformity.        Left foot: There is normal range of motion and no deformity.   Decreased ROM with out joint pain nor crepitation to bilateral feet and ankle joints.  Muscle strength 4/5 in all groups bilaterally  Hammertoes noted, digits 2-5 b/L    with adductovarus rotation of b/L   fifth digit.       Neurological: He is alert and oriented to person, place, and time.   Gross sensation intact b/L.      Skin: Skin is warm. Capillary refill takes more than 3 seconds. No abrasion, no bruising, no burn and no ecchymosis noted.   Nails x10 are elongated by  4-5mm's, thickened by 2-3 mm's, dystrophic, and are yellowish in  coloration . Xerosis Bilaterally. No open lesions noted.      Psychiatric: He has a normal mood and affect. His speech is normal and behavior is normal. He is attentive.             Assessment:       Encounter Diagnoses   Name Primary?    Swelling Yes    Onychomycosis due to dermatophyte     Diabetic polyneuropathy associated with type 2 diabetes mellitus          Plan:       Kulwant was seen today for diabetes mellitus, diabetic foot exam and routine foot care.    Diagnoses and all orders for this visit:    Swelling  -     COMPRESSION STOCKINGS    Onychomycosis due to dermatophyte    Diabetic polyneuropathy associated with type 2  diabetes mellitus      I counseled the patient on his conditions, their implications and medical management.        - Shoe inspection. Diabetic Foot Education. Patient reminded of the importance of good nutrition and blood sugar control to help prevent podiatric complications of diabetes. Patient instructed on proper foot hygeine. We discussed wearing proper shoe gear, daily foot inspections, never walking without protective shoe gear, never putting sharp instruments to feet, routine podiatric nail visits every 2-3 months.      - With patient's permission, nails were aggressively reduced and debrided x 10 to their soft tissue attachment mechanically and with electric , removing all offending nail and debris. Patient relates relief following the procedure. He will continue to monitor the areas daily, inspect his feet, wear protective shoe gear when ambulatory, moisturizer to maintain skin integrity and follow in this office in approximately 2-3 months, sooner p.r.n.

## 2018-07-19 ENCOUNTER — TELEPHONE (OUTPATIENT)
Dept: PODIATRY | Facility: CLINIC | Age: 83
End: 2018-07-19

## 2018-07-19 NOTE — TELEPHONE ENCOUNTER
Spoke with pt daughter she stated that [employer] did not get the fax sent over to them and they try to contact us about 3 times and we didn't received any calls from them about this. I advised the pt that I did fax over the Rx to [employer]

## 2018-07-20 ENCOUNTER — TELEPHONE (OUTPATIENT)
Dept: PODIATRY | Facility: CLINIC | Age: 83
End: 2018-07-20

## 2018-07-20 NOTE — TELEPHONE ENCOUNTER
Made son  Aware  Fax was sent to [facility] and pt daughter if she can call  [facility]  To confimr if needed

## 2018-07-20 NOTE — TELEPHONE ENCOUNTER
----- Message from Nava Shilpa sent at 7/19/2018  4:43 PM CDT -----  Contact: spouse, [phone]  Patient states [employer] did not get fax you just sent. Please advise.